# Patient Record
Sex: FEMALE | Race: WHITE | NOT HISPANIC OR LATINO | Employment: OTHER | URBAN - METROPOLITAN AREA
[De-identification: names, ages, dates, MRNs, and addresses within clinical notes are randomized per-mention and may not be internally consistent; named-entity substitution may affect disease eponyms.]

---

## 2021-07-26 NOTE — PATIENT DISCUSSION
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 Recommend appointment with corneal specialist, Dr. Cuca Flor.

## 2021-07-26 NOTE — PATIENT DISCUSSION
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  ? reoccurrence today. Stromal haze evident today without dendrite. Patient states symptoms are the same as previous occurrences. Patient was treated previously with Zirgan and Acyclovir. Start Zirgan 5x/day. Start Acyclovir 400mg 5x/day po. Will likely recommend long term use of oral anti viral medication due to reoccurrence.

## 2023-04-04 ENCOUNTER — NEW PATIENT (OUTPATIENT)
Dept: URBAN - METROPOLITAN AREA CLINIC 49 | Facility: CLINIC | Age: 78
End: 2023-04-04

## 2023-04-04 DIAGNOSIS — H35.363: ICD-10-CM

## 2023-04-04 DIAGNOSIS — H35.372: ICD-10-CM

## 2023-04-04 DIAGNOSIS — H43.811: ICD-10-CM

## 2023-04-04 DIAGNOSIS — H40.013: ICD-10-CM

## 2023-04-04 DIAGNOSIS — H26.493: ICD-10-CM

## 2023-04-04 PROCEDURE — 99204 OFFICE O/P NEW MOD 45 MIN: CPT

## 2023-04-04 PROCEDURE — 92134 CPTRZ OPH DX IMG PST SGM RTA: CPT

## 2023-04-04 ASSESSMENT — TONOMETRY
OS_IOP_MMHG: 14
OD_IOP_MMHG: 14

## 2023-04-04 ASSESSMENT — VISUAL ACUITY
OS_SC: 20/200
OU_CC: J1+
OD_PH: 20/25
OD_CC: J1+
OS_CC: J16
OU_SC: 20/30
OD_SC: 20/30
OD_GLARE: 20/30
OD_GLARE: 20/50
OS_GLARE: 20/400